# Patient Record
Sex: FEMALE | Race: WHITE
[De-identification: names, ages, dates, MRNs, and addresses within clinical notes are randomized per-mention and may not be internally consistent; named-entity substitution may affect disease eponyms.]

---

## 2018-02-15 NOTE — RAD
CERVICAL SPINE 5 VIEWS:

 

Date:  02/15/18 

 

HISTORY:  

Neck pain. 

 

FINDINGS:

At the C6-7 level, disc replacement is apparent. There is widening of the anterior spinous distance a
t this postoperative level. It does not change upon flexion or extension. No abnormal translational m
otion. Cervicothoracic junction is intact. Scattered osteophytosis is present throughout the cervical
 spine. 

 

IMPRESSION: 

Degenerative and postoperative changes of the cervical spine. No acute osseous abnormalities are demo
nstrated. 

 

 

POS: [facility]

## 2018-08-06 NOTE — RAD
CERVICAL SPINE RADIOGRAPHS THREE VIEWS:

 

History: Disorder of intervertebral discs, at C5-6 level. 

 

FINDINGS: 

There is a disc space prosthesis of C5-6 with partial osseous fusion of the C5-6 vertebra, involving 
the anterior column. There is straightening of the normal cervical curvature. There is multilevel mil
d to moderate degenerative change. No significant subluxation. Prevertebral soft tissues are of alyx
l caliber. 

 

Evaluation of flexion and extension positioning reveals no significant translational motion. Evaluati
on of the cervical spine is limited without the presence of frontal projection or odontoid views. 

 

IMPRESSION: 

Post-operative cervical spine, without evidence of significant subluxation or translation of motion. 


 

POS: [facility]

## 2018-10-17 NOTE — RAD
CERVICAL SPINE THREE VIEWS:

 

History: Neck pain. 

 

Comparison: 8-6-18

 

FINDINGS: 

Post op changes are again noted with interbody disc implants seen at C5-6. Degenerative changes and l
oss of disc space at C4-5. Anterior spurring at C4, C5, C6 and C7. 

 

No interval change from 8-6-18. No change in alignment with flexion and extension. 

 

IMPRESSION: 

Post-operative and degenerative changes of the cervical spine appear stable. 

 

POS: [facility]

## 2018-10-17 NOTE — MRI
CERVICAL SPINE MRI WITHOUT CONTRAST:

 

Date:  10/17/18 

 

HISTORY:  

Spondylolisthesis. Foraminal stenosis. Neck pain, radiation down the right side. 

 

COMPARISON:  

02/15/18. 

 

CORRELATION:

Cervical spine radiograph series dated 10/17/18. 

 

FINDINGS:

There is extensive metallic susceptibility artifact secondary to a disc prosthesis at the C5-C6 level
. The degree of metallic susceptibility artifact is similar to the previous examination. With the exc
eption of the arrangement of metallic susceptibility artifact, there is appropriate T1 marrow signal 
intensity of the cervical vertebra. No evidence of fracture. No significant STIR hyperintensity to gomez
ggest vertebral body edema or ligamentous injury. 

 

Visualized brain parenchyma, cervicomedullary junction, upper cervical cord, and the upper thoracic c
ord have a normal size and signal intensity. Marked limited evaluation of the cervical cord at the le
justus of fusion. 

 

C2-C3:  There is a small right paracentral disc osteophyte complex which indents and deforms the righ
t hemicord. Mild central canal stenosis. Foramina are patent. 

 

C3-C4:  There is a broad based disc osteophyte complex with a central component. There is deformity o
f the ventral thecal sac and ventral cord. Moderate central canal stenosis. No signal abnormality in 
the cord. Neural foramina are patent bilaterally. 

 

C4:  Limited evaluation due to metallic susceptibility artifact. Grossly, no evidence of high grade c
entral canal stenosis or high grade foraminal narrowing. 

 

C5-C6:  Markedly limited evaluation due to metallic susceptibility artifact. 

 

C6-C7:  No significant central canal stenosis or foraminal narrowing. 

 

C7-T1:  No significant central canal stenosis or foraminal narrowing. 

 

IMPRESSION: 

1.  Degenerative changes of the upper cervical spine as described above. The degree of central canal 
stenosis at C3-C4 has not significantly changed. 

 

2.  Limited evaluation of the cervical spine at the level of fusion due to metallic susceptibility ar
tifact. 

 

 

POS: [facility]

## 2018-11-23 ENCOUNTER — HOSPITAL ENCOUNTER (EMERGENCY)
Dept: HOSPITAL 92 - SCSER | Age: 38
Discharge: HOME | End: 2018-11-23
Payer: COMMERCIAL

## 2018-11-23 DIAGNOSIS — F31.9: ICD-10-CM

## 2018-11-23 DIAGNOSIS — F41.9: ICD-10-CM

## 2018-11-23 DIAGNOSIS — K59.00: Primary | ICD-10-CM

## 2018-11-23 DIAGNOSIS — Z79.899: ICD-10-CM

## 2018-11-23 LAB
ALBUMIN SERPL BCG-MCNC: 4.4 G/DL (ref 3.5–5)
ALP SERPL-CCNC: 65 U/L (ref 40–150)
ALT SERPL W P-5'-P-CCNC: 88 U/L (ref 8–55)
ANION GAP SERPL CALC-SCNC: 14 MMOL/L (ref 10–20)
AST SERPL-CCNC: 98 U/L (ref 5–34)
BASOPHILS # BLD AUTO: 0 THOU/UL (ref 0–0.2)
BASOPHILS NFR BLD AUTO: 0.3 % (ref 0–1)
BILIRUB SERPL-MCNC: 0.8 MG/DL (ref 0.2–1.2)
BUN SERPL-MCNC: 16 MG/DL (ref 7–18.7)
CALCIUM SERPL-MCNC: 10.3 MG/DL (ref 7.8–10.44)
CHLORIDE SERPL-SCNC: 109 MMOL/L (ref 98–107)
CO2 SERPL-SCNC: 21 MMOL/L (ref 22–29)
CREAT CL PREDICTED SERPL C-G-VRATE: 0 ML/MIN (ref 70–130)
EOSINOPHIL # BLD AUTO: 0.2 THOU/UL (ref 0–0.7)
EOSINOPHIL NFR BLD AUTO: 2.1 % (ref 0–10)
GLOBULIN SER CALC-MCNC: 3.1 G/DL (ref 2.4–3.5)
GLUCOSE SERPL-MCNC: 134 MG/DL (ref 70–105)
HGB BLD-MCNC: 13.1 G/DL (ref 12–16)
LIPASE SERPL-CCNC: 10 U/L (ref 8–78)
LYMPHOCYTES # BLD: 0.9 THOU/UL (ref 1.2–3.4)
LYMPHOCYTES NFR BLD AUTO: 7.6 % (ref 21–51)
MCH RBC QN AUTO: 29.8 PG (ref 27–31)
MCV RBC AUTO: 88.7 FL (ref 78–98)
MONOCYTES # BLD AUTO: 0.4 THOU/UL (ref 0.11–0.59)
MONOCYTES NFR BLD AUTO: 3.7 % (ref 0–10)
NEUTROPHILS # BLD AUTO: 10.3 THOU/UL (ref 1.4–6.5)
NEUTROPHILS NFR BLD AUTO: 86.3 % (ref 42–75)
PLATELET # BLD AUTO: 226 THOU/UL (ref 130–400)
POTASSIUM SERPL-SCNC: 3.4 MMOL/L (ref 3.5–5.1)
RBC # BLD AUTO: 4.4 MILL/UL (ref 4.2–5.4)
SODIUM SERPL-SCNC: 141 MMOL/L (ref 136–145)
SP GR UR STRIP: 1.01 (ref 1–1.03)
WBC # BLD AUTO: 11.9 THOU/UL (ref 4.8–10.8)

## 2018-11-23 PROCEDURE — 96365 THER/PROPH/DIAG IV INF INIT: CPT

## 2018-11-23 PROCEDURE — 80053 COMPREHEN METABOLIC PANEL: CPT

## 2018-11-23 PROCEDURE — 83690 ASSAY OF LIPASE: CPT

## 2018-11-23 PROCEDURE — 81003 URINALYSIS AUTO W/O SCOPE: CPT

## 2018-11-23 PROCEDURE — 74177 CT ABD & PELVIS W/CONTRAST: CPT

## 2018-11-23 PROCEDURE — 85025 COMPLETE CBC W/AUTO DIFF WBC: CPT

## 2019-07-16 ENCOUNTER — HOSPITAL ENCOUNTER (OUTPATIENT)
Dept: HOSPITAL 92 - RAD | Age: 39
Discharge: HOME | End: 2019-07-16
Attending: ANESTHESIOLOGY
Payer: COMMERCIAL

## 2019-07-16 DIAGNOSIS — M43.12: Primary | ICD-10-CM

## 2019-07-16 DIAGNOSIS — M47.812: ICD-10-CM

## 2019-07-16 DIAGNOSIS — Z98.890: ICD-10-CM

## 2019-07-16 PROCEDURE — 72050 X-RAY EXAM NECK SPINE 4/5VWS: CPT

## 2019-07-16 NOTE — RAD
CERVICAL SPINE 5 VIEWS:

 

HISTORY: 

Spondylolisthesis cervical region.  

 

COMPARISON: 

6/17/2015.

 

FINDINGS: 

Interbody disk implants are noted at C3-C4 and C5-C6.  Otherwise, there is some generalized spondylos
is, particularly at C6-C7.  No evidence for malalignment or abnormal translation between flexion and 
extension.  Odontoid and C1 regions are somewhat obscured on the AP open mouth view.  No prevertebral
 soft tissue swelling.

 

IMPRESSION: 

Interbody postop disk changes at C3-C4 and C5-C6.  Mild spondylosis.  No abnormal translation between
 flexion and extension.

 

POS: [facility]

## 2020-12-21 NOTE — CT
CONTRAST ENHANCED ABDOMEN AND PELVIC CT:

 

INDICATION: 

Pain.

 

FINDINGS: 

There is moderate distention of the gallbladder.  Punctate hypoattenuation of the liver is too small 
to further characterize.  There is no pancreatic or splenic pathology.  Adrenal glands and kidneys re
veal no acute findings. T here is a small probable cyst of the right kidney, although too small to de
finitively characterize.  Contrast-opacified bowel reveals no abnormal dilatation.  There is fluid co
ntent of the colon which can be seen in the setting of colitis.  Correlate clinically.  There is no f
ree air or portal vein gas.  The visualized lung bases reveal minimal subpleural volume loss.  There 
is no acute osseous pathology.  

 

IMPRESSION: 

1.  Fluid content of the colon which can be seen in the setting of colitis.  Correlate clinically.

 

2.  Moderate distension of the gallbladder.  As necessary, this may be further assessed with gallblad
krystina ultrasound if clinically warranted.

 

3.  Additional details as described above.

 

POS: [facility]ANY Advancement Flap (Single) Text: The defect edges were debeveled with a #15 scalpel blade.  Given the location of the defect and the proximity to free margins a single advancement flap was deemed most appropriate.  Using a sterile surgical marker, an appropriate advancement flap was drawn incorporating the defect and placing the expected incisions within the relaxed skin tension lines where possible.    The area thus outlined was incised deep to adipose tissue with a #15 scalpel blade.  The skin margins were undermined to an appropriate distance in all directions utilizing iris scissors.